# Patient Record
Sex: MALE | Race: BLACK OR AFRICAN AMERICAN | NOT HISPANIC OR LATINO | Employment: UNEMPLOYED | URBAN - METROPOLITAN AREA
[De-identification: names, ages, dates, MRNs, and addresses within clinical notes are randomized per-mention and may not be internally consistent; named-entity substitution may affect disease eponyms.]

---
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
## 2025-05-26 ENCOUNTER — HOSPITAL ENCOUNTER (OUTPATIENT)
Dept: RADIOLOGY | Facility: HOSPITAL | Age: 20
Discharge: HOME | End: 2025-05-26
Attending: NURSE PRACTITIONER
Payer: COMMERCIAL

## 2025-05-26 ENCOUNTER — NURSE TRIAGE (OUTPATIENT)
Dept: ADMINISTRATIVE | Facility: CLINIC | Age: 20
End: 2025-05-26
Payer: COMMERCIAL

## 2025-05-26 ENCOUNTER — RESULTS FOLLOW-UP (OUTPATIENT)
Dept: PRIMARY CARE CLINIC | Facility: CLINIC | Age: 20
End: 2025-05-26

## 2025-05-26 ENCOUNTER — OFFICE VISIT (OUTPATIENT)
Dept: PRIMARY CARE CLINIC | Facility: CLINIC | Age: 20
End: 2025-05-26
Payer: COMMERCIAL

## 2025-05-26 VITALS
HEIGHT: 68 IN | HEART RATE: 64 BPM | RESPIRATION RATE: 18 BRPM | OXYGEN SATURATION: 100 % | BODY MASS INDEX: 21.31 KG/M2 | WEIGHT: 140.63 LBS | SYSTOLIC BLOOD PRESSURE: 106 MMHG | DIASTOLIC BLOOD PRESSURE: 64 MMHG

## 2025-05-26 DIAGNOSIS — M25.571 ACUTE RIGHT ANKLE PAIN: ICD-10-CM

## 2025-05-26 DIAGNOSIS — M25.571 ACUTE RIGHT ANKLE PAIN: Primary | ICD-10-CM

## 2025-05-26 PROCEDURE — 1159F MED LIST DOCD IN RCRD: CPT | Mod: CPTII,S$GLB,, | Performed by: NURSE PRACTITIONER

## 2025-05-26 PROCEDURE — 99213 OFFICE O/P EST LOW 20 MIN: CPT | Mod: S$GLB,,, | Performed by: NURSE PRACTITIONER

## 2025-05-26 PROCEDURE — 1160F RVW MEDS BY RX/DR IN RCRD: CPT | Mod: CPTII,S$GLB,, | Performed by: NURSE PRACTITIONER

## 2025-05-26 PROCEDURE — 99999 PR PBB SHADOW E&M-EST. PATIENT-LVL IV: CPT | Mod: PBBFAC,,, | Performed by: NURSE PRACTITIONER

## 2025-05-26 PROCEDURE — 3008F BODY MASS INDEX DOCD: CPT | Mod: CPTII,S$GLB,, | Performed by: NURSE PRACTITIONER

## 2025-05-26 PROCEDURE — 3074F SYST BP LT 130 MM HG: CPT | Mod: CPTII,S$GLB,, | Performed by: NURSE PRACTITIONER

## 2025-05-26 PROCEDURE — 73610 X-RAY EXAM OF ANKLE: CPT | Mod: TC,PN,RT

## 2025-05-26 PROCEDURE — 73610 X-RAY EXAM OF ANKLE: CPT | Mod: 26,RT,, | Performed by: RADIOLOGY

## 2025-05-26 PROCEDURE — 3078F DIAST BP <80 MM HG: CPT | Mod: CPTII,S$GLB,, | Performed by: NURSE PRACTITIONER

## 2025-05-26 NOTE — PROGRESS NOTES
[facility]      Assessment     1. Acute right ankle pain         Plan     Anabell Wu is a pleasant 20 y.o.male. The following was addressed today.    1. Acute right ankle pain  - X-Ray Ankle Complete Right; Future     Assessment & Plan  Acute right ankle pain  Mild to moderate tenderness of lateral and medial malleolus.   Xray right ankle: negative for fracture  Continue RICE  Follow-up with persistent symptoms and will consider sports med/orthopedic referral.    Orders Placed This Encounter    X-Ray Ankle Complete Right            Subjective      Patient ID:  Anabell Wu is a 20 y.o. male who is here today for: Ankle Pain     History of Present Illness:    History of Present Illness    CHIEF COMPLAINT:  Anabell presents today for right ankle pain after basketball injury.    HISTORY OF PRESENT ILLNESS:  He injured his right ankle on Friday while landing on uneven concrete during a basketball layup. He was able to bear weight immediately after injury and rode his bike home. He has been ambulatory throughout the weekend. He obtained an ankle brace last night and has been using ice, naproxen, and elevation for management.    PAST MEDICAL HISTORY:  History of ankle sprain in high school.      ROS:  ROS as indicated in HPI.           Problem list:    Active Problem List with Overview Notes    Diagnosis Date Noted    Decreased range of motion 09/06/2022        Medications:    Current Outpatient Medications   Medication Instructions    adapalene 0.3 % gel Apply a pea-sized amount to entire face qhs.    cetirizine (ZYRTEC) 10 mg, Daily    ciclopirox 1 % shampoo Lather scalp for 5-10 min, then rinse. Use 1-2x/week.    loratadine (CLARITIN) 10 mg, Daily         Allergies:    Review of patient's allergies indicates:  No Known Allergies      Objective     Vital Signs:      Vital Signs  Pulse: 64  Resp: 18  SpO2: 100 %  BP: 106/64  BP Location: Right arm  Patient Position: Sitting  Pain Score:   6  Pain  "Loc: Ankle  Height and Weight  Height: 5' 8" (172.7 cm)  Weight: 63.8 kg (140 lb 10.5 oz)  BSA (Calculated - sq m): 1.75 sq meters  BMI (Calculated): 21.4  Weight in (lb) to have BMI = 25: 164.1]          Physical Exam:    Physical Exam  Vitals reviewed.   HENT:      Head: Normocephalic.   Eyes:      Conjunctiva/sclera: Conjunctivae normal.   Pulmonary:      Effort: Pulmonary effort is normal.   Musculoskeletal:      Right ankle: No swelling, deformity, ecchymosis or lacerations. Tenderness present over the lateral malleolus and medial malleolus. No base of 5th metatarsal tenderness. Normal range of motion.      Right Achilles Tendon: Normal.   Skin:     Capillary Refill: Capillary refill takes less than 2 seconds.   Neurological:      Mental Status: He is alert and oriented to person, place, and time.   Psychiatric:         Mood and Affect: Mood normal.          This note was generated with the assistance of ambient listening technology. Verbal consent was obtained by the patient and accompanying visitor(s) for the recording of patient appointment to facilitate this note. I attest to having reviewed and edited the generated note for accuracy, though some syntax or spelling errors may persist. Please contact the author of this note for any clarification.     "

## 2025-05-26 NOTE — TELEPHONE ENCOUNTER
Pt calling with c/o ankle pain and he said that he was playing basket ball  and came down wrong and he has been icing and heat and has it wrapped and wanted to get checked. Pt triaged and care advice to be seen in the office today and appt made and then if needs referral they can assist, Pt told to call back if any other questions or concerns or worsening once home.                   Reason for Disposition   Large swelling or bruise (> 2 inches or 5 cm)    Additional Information   Negative: Major bleeding (e.g., actively dripping or spurting) and can't be stopped   Negative: Amputation or bone sticking through the skin   Negative: Looks like a dislocated joint (very crooked or deformed)   Negative: Serious injury with multiple fractures (broken bones)   Negative: Sounds like a life-threatening emergency to the triager   Negative: Bullet wound, stabbed by knife, or other serious penetrating wound   Negative: Can't stand (bear weight) or walk   Negative: Skin is split open or gaping (or length > 1/2 inch or 12 mm)   Negative: Bleeding and won't stop after 10 minutes of direct pressure (using correct technique)   Negative: Dirt in the wound and not removed with 15 minutes of scrubbing   Negative: New numbness (loss of sensation) of foot or toe(s) and present now   Negative: Sounds like a serious injury to the triager   Negative: SEVERE pain (e.g., excruciating)   Negative: A 'snap' or 'pop' was heard at the time of injury    Protocols used: Ankle Injury-A-OH